# Patient Record
Sex: MALE | Race: WHITE | Employment: OTHER | URBAN - METROPOLITAN AREA
[De-identification: names, ages, dates, MRNs, and addresses within clinical notes are randomized per-mention and may not be internally consistent; named-entity substitution may affect disease eponyms.]

---

## 2017-06-12 ENCOUNTER — HOSPITAL ENCOUNTER (OUTPATIENT)
Dept: PHYSICAL THERAPY | Age: 72
Discharge: HOME | End: 2017-06-12
Payer: MEDICARE

## 2017-06-12 PROCEDURE — G8979 MOBILITY GOAL STATUS: HCPCS

## 2017-06-12 PROCEDURE — G8978 MOBILITY CURRENT STATUS: HCPCS

## 2017-06-12 PROCEDURE — 97163 PT EVAL HIGH COMPLEX 45 MIN: CPT

## 2017-06-12 PROCEDURE — 97110 THERAPEUTIC EXERCISES: CPT

## 2017-06-12 NOTE — PROGRESS NOTES
[facility]              [address] [facility]        [place] esteves, [address] [place]   [phone] fax    Plan of Care/ Statement of Necessity for Physical Therapy Services    Patient name: Prudence Plummer Start of Care: 2017   Referral source: Aylin Shankar MD : 1945    Medical Diagnosis: Other abnormalities of gait and mobility [R26.89]   Onset Date:    Treatment Diagnosis: Balance Deficits    Prior Hospitalization: see medical history Provider#: [id number]   Medications: Verified on Patient summary List    Comorbidities: Anxiety, Arthritis, Back pain, COPD, Depression, HTN, Kidney Disease, Osteoporosis, Sleep Dysfunction, PE (),    Prior Level of Function: Prior to  patient denies use of AD with ambulation with (I) with performance of self-care and household ADLs      The Plan of Care and following information is based on the information from the initial evaluation. Assessment/ key information:     Patient presents with a chronic history of low back pain and a history of falls (last fall 2013) with a complicated medical history including the following: COPD, mild to moderate sensorineural hearing loss bilaterally, HTN, osteoporosis and kidney disease. Patient as well demonstrates observationally signs consistent with diastasis recti, questioning contribution to low back pain subjectively reported. Patient noted with a wide base of support with use of single point cane with ambulation secondary to balance deficits. Patient with symmetrical and full LE strength demonstrated bilaterally but noted with diminished balance with maintenance of a narrow base of support and SLS.  Patient demonstrates an increased falls risk as evident by Arlin Ramirez Balance score and use of AD with ambulation.     Patient will benefit from skilled PT services to address functional mobility deficits, address strength deficits, analyze and cue movement patterns, assess and modify postural abnormalities, address imbalance/dizziness and instruct in home and community integration to attain remaining goals. Patient currently under the care of a Chiropractor for LBP therefore emphasis of Physical Therapy PoC to be placed on balance deficits. Plan of care to incorporate exercises to improve patient's proprioceptive and kinesthetic LE awareness, improve hip, knee and ankle balance strategies with incorporation of strategies to incorporate the vestibular, visual and proprioceptive systems. Limitations may present secondary to poor tolerance to supine lying, poor activity tolerance and weakness of the abdominal musculature. Patient presents with a fair rehab prognosis secondary to chronicity of symptoms and comorbidities. Evaluation Complexity History HIGH Complexity :3+ comorbidities / personal factors will impact the outcome/ POC ; Examination HIGH Complexity : 4+ Standardized tests and measures addressing body structure, function, activity limitation and / or participation in recreation  ;Presentation HIGH Complexity : Unstable and unpredictable characteristics  ; Clinical Decision Making MEDIUM Complexity : FOTO score of 26-74  Overall Complexity Rating: HIGH   Problem List: pain affecting function, decrease strength, impaired gait/ balance, decrease ADL/ functional abilitiies, decrease activity tolerance and decrease transfer abilities   Treatment Plan may include any combination of the following: Therapeutic exercise, Therapeutic activities, Neuromuscular re-education, Physical agent/modality, Gait/balance training, Manual therapy, Patient education, Functional mobility training, Home safety training and Stair training  Patient / Family readiness to learn indicated by: asking questions, trying to perform skills and interest  Persons(s) to be included in education: patient (P)  Barriers to Learning/Limitations: yes;  sensory deficits-vision/hearing/speech  Patient Goal (s): [employer] without pain, Walk without an assistive device  Patient Self Reported Health Status: fair  Rehabilitation Potential: fair    Short Term Goals: To be accomplished in 3 weeks:  1. Patient will be independent in performance of prescribed HEP. EVAL: HEP provided  2. Patient will be able to ambulate 2 blocks with only \"Moderate Difficulty\" per FOTO in order to improve community access. 3. Patient will demonstrate the ability to perform a posterior pelvic tilt in standing with correct form in order to improve proximal stability and standing tolerance. Long Term Goals: To be accomplished in 6 weeks:  1. Patient will demonstrate a significant functional improvement as demonstrated by a score of >50 on FOTO. 2. Patient will demonstrate the ability to perform sit-to-stand with correct mechanics and form in order to improve ability to perform transfers safely. 3. Patient will score >52 on the Champion Balance Test in order to demonstrate a diminished falls risk. 4. Patient will report \"No difficulty\" with getting in/out of the car per FOTO in order to improve community access. Frequency / Duration: Patient to be seen 2 times per week for 6 weeks. Patient/ Caregiver education and instruction: Diagnosis, prognosis, self care, activity modification and exercises   [x]  Plan of care has been reviewed with PTA    G-Codes (GP)  Mobility   Current  CL= 60-79%   Goal  CK= 40-59%    The severity rating is based on clinical judgment and the FOTO score. Certification Period: 6/12/2017 - 8/12/2017  Isabel Hodge 6/12/2017 10:45 AM  ________________________________________________________________________    I certify that the above Therapy Services are being furnished while the patient is under my care. I agree with the treatment plan and certify that this therapy is necessary.     [id number] Signature:____________________  Date:____________Time: _________    Please sign and return to [facility]  [facility]              [address] [facility], [address] [place]   [phone] fax

## 2017-06-12 NOTE — PROGRESS NOTES
PT DAILY TREATMENT NOTE/NEURO EVAL 3-16    Patient Name: Vignesh Gomes  Date:2017  : 1945  [x]  Patient  Verified  Payor: Cy Dolan / Plan: [place] / Product Type: Medicare /    In time:902  Out time:  Total Treatment Time (min): 73  Total Timed Codes (min): 73  1:1 Treatment Time ( [address] only): 73   Visit #: 1 of 12    Treatment Area: Other abnormalities of gait and mobility [R26.89]    SUBJECTIVE  Pain Level (0-10 scale): (Worst 10/10, Best 2/10)  []constant [x]intermittent []improving []worsening []no change since onset    Any medication changes, allergies to medications, adverse drug reactions, diagnosis change, or new procedure performed?: [x] No    [] Yes (see summary sheet for update)  Subjective functional status/changes:     PLOF: Prior to  patient denies use of AD with ambulation with (I) with performance of self-care and household ADLs  Comorbidities: Anxiety, Arthritis, Back pain, COPD (Nebulizer), Depression, HTN, Kidney Disease, Osteoporosis, Sleep Dysfunction, PE (),   Limitations to PLOF: Lifting, Prolonged walking (seconday to COPD), Climbing ladder  Mechanism of Injury: Onset ;  No specific TIFFANIE  Current symptoms/Complaints:Central Lumbosacral Pain, R Posterior LE (pain along R gluteal region distally to the popliteal fossa - relief of posterior thigh pain with Chiropractic care), Dizziness (vertiginous - Occurs with quick movement, Prolonged walking), Visual impairments (associated with dizziness), Hearing impairments (diminished acuity), Headache (frontal region), Bilateral LE N/T (reported along foot and distal lower leg) [Patient denies the following: Ear tinnitus, Occipital throbbing, bowel/bladder changes, pain with urination]  Previous Treatment/Compliance: Chiropractor (Current - 1x/week; significant improvement reported)  PMHx/Surgical Hx: Cataract Surgery (L/R), Fall History x2 (last 2017 - No LoC)  Work Hx: Retired  Living Situation: 1-story home, Lives with wife  Pt Goals: Ambulate without assistive device  Motivation: Patient appears motivated to return to previous level of function  FABQ Score: []low [x]elevate  Cognition: A & O x 3    Other:    Modality rationale: decrease pain to improve the patients ability to to return to performance of functional ADLs   10' []  Ice     [x]  heat Position: Seated  Location: Lumbar Spine       53 min [x]Eval                  []Re-Eval       10 min Therapeutic Exercise:  [x] See flow sheet : Patient educated in performance of prescribed HEP. Rationale: improve balance and increase proprioception to improve the patients ability to decrease falls risk and improve balance reactions. With   [] TE   [] TA   [] neuro   [] other: Patient Education: [x] Review HEP    [] Progressed/Changed HEP based on: Patient educated in performance of prescribed HEP and provided with written HEP instructions  [] positioning   [] body mechanics   [] transfers   [] heat/ice application    [] other:      Physical Therapy Evaluation  Neurologic    Vitals  BP: 152/92 mmHg (Post-Evaluation 145/90 mmHg)  HR: 68 bpm    Posture: [] Poor    [] Fair    [x] Good    Describe: Wide base of support    Supine: Evidence of Diastasis recti with patient with PMH significant for inguinal hernia x2    Gait: [] Normal    [x] Abnormal    Device: Single Point Cane   Describe: R lateral weight throughout gait pattern                                Hip L (1-5) R (1-5)   Hip Flexion 5 5   Hip ABD 5 5   Hip ER 5 5   Hip IR 5 5     Knee L (1-5) R (1-5)   Knee Flexion 5 5   Knee Extension 5 5   Ankle DF 5 5     **Limited assessment in supine secondary to patient intolerance to supine lying. Patient with inability to assume prone position therefore MMT not performed in this position. Lumbar AROM: Unable to assess secondary to patient with diminished balance reactions with testing deemed to be unsafe.     Sensation: Intact sensation noted to dermatome L4-S1 on the L/R    Reflexes: [] Not Tested   Left Right   Knee Jerk (L4) 2+ 2+   Ankle Jerk (SI) 2+ 2+         Sitting Balance: Static:  [x] Good    [] Fair    [] Poor        Standing Balance:    1. Rhomberg: EO 30, EC 30 (sagittal swaying)   2. Sharpened Rhomberg: EO 30 (L/R). EC 2 (L) / 6 (R)   3. SLS: L 4 sec, R 2 sec (R gluteal pain)   4. Sit-to-Stand: Diminished hip and knee extension with performance bilaterally  with maintenance of a wide Eric with ER of bilateral LEs    Other:       Impaired Judgement: [] Y    [x] N      Impaired Vision:  [x] Y    [] N      Safety Awareness Deficits  [] Y    [x] N      Impaired Hearing  [x] Y    [] N      Able to Express Needs [x] Y    [] N    Optional Tests:       Champion Balance Scale (56pt scale):  49/56    Pain Level (0-10 scale) post treatment: 8/10    ASSESSMENT/Changes in Function: Patient presents with a chronic history of low back pain and a history of falls (last fall January 7712) with a complicated medical history including the following: COPD, mild to moderate sensorineural hearing loss bilaterally, HTN, osteoporosis and kidney disease. Patient as well demonstrates observationally signs consistent with diastasis recti, questioning contribution to low back pain subjectively reported. Patient noted with a wide base of support with use of single point cane with ambulation secondary to balance deficits. Patient with symmetrical and full LE strength demonstrated bilaterally but noted with diminished balance with maintenance of a narrow base of support and SLS. Patient demonstrates an increased falls risk as evident by Arlin Ramirez Balance score and use of AD with ambulation. Patient will benefit from skilled PT services to address functional mobility deficits, address strength deficits, analyze and cue movement patterns, assess and modify postural abnormalities, address imbalance/dizziness and instruct in home and community integration to attain remaining goals.  Patient currently under the care of a Chiropractor for LBP therefore emphasis of Physical Therapy PoC to be placed on balance deficits. Plan of care to incorporate exercises to improve patient's proprioceptive and kinesthetic LE awareness, improve hip, knee and ankle balance strategies with incorporation of strategies to incorporate the vestibular, visual and proprioceptive systems. Limitations may present secondary to poor tolerance to supine lying, poor activity tolerance and weakness of the abdominal musculature. Patient presents with a fair rehab prognosis secondary to chronicity of symptoms and comorbidities. [x]  See Plan of Care  []  See progress note/recertification  []  See Discharge Summary         Progress towards goals / Updated goals:    Short Term Goals: To be accomplished in 3 weeks:  1. Patient will be independent in performance of prescribed HEP. EVAL: HEP provided  2. Patient will be able to ambulate 2 blocks with only \"Moderate Difficulty\" per FOTO in order to improve community access. EVAL: \"Quite a bit of difficulty\"  3. Patient will demonstrate the ability to perform a posterior pelvic tilt in standing with correct form in order to improve proximal stability and standing tolerance. EVAL: Performance of PPT in sitting educated  Long Term Goals: To be accomplished in 6 weeks:  1. Patient will demonstrate a significant functional improvement as demonstrated by a score of >50 on FOTO. EVAL: FOTO 32  2. Patient will demonstrate the ability to perform sit-to-stand with correct mechanics and form in order to improve ability to perform transfers safely. EVAL: Maintenance of hip and knee flexion with performance  3. Patient will score >52 on the Champion Balance Test in order to demonstrate a diminished falls risk. EVAL: 49/56  4. Patient will report \"No difficulty\" with getting in/out of the car per FOTO in order to improve community access.   EVAL: \"Moderate difficulty\"      PLAN  [x]  Upgrade activities as tolerated     []  Continue plan of care  []  Update interventions per flow sheet       []  Discharge due to:_  [x]  Other:2x/week, x6 weeks     Marek Archibald PT, DPT 6/12/2017  8:49 AM

## 2017-06-16 ENCOUNTER — HOSPITAL ENCOUNTER (OUTPATIENT)
Dept: PHYSICAL THERAPY | Age: 72
Discharge: HOME | End: 2017-06-16
Payer: MEDICARE

## 2017-06-16 PROCEDURE — 97112 NEUROMUSCULAR REEDUCATION: CPT

## 2017-06-16 PROCEDURE — 97110 THERAPEUTIC EXERCISES: CPT

## 2017-06-16 NOTE — PROGRESS NOTES
PT DAILY TREATMENT NOTE - [facility]     Patient Name: Vickey Record  Date:2017  : 1945  [x]  Patient  Verified  Payor: Madan Hyman / Plan: [place] MED[place]ARE PART A & B / Product Type: Medicare /    In [id number]  Out [id number]  Total Treatment Time (min): 39  Total Timed Codes (min): 29  1:1 Treatment Time ([facility] only): 29   Visit #: 2 of 12    Treatment Area: Other abnormalities of gait and mobility [R26.89]    SUBJECTIVE  Pain Level (0-10 scale): 4/10 (LBP, R Hip)  Any medication changes, allergies to medications, adverse drug reactions, diagnosis change, or new procedure performed?: [x] No    [] Yes (see summary sheet for update)  Subjective functional status/changes:   [] No changes reported  Patient reports performance of prescribed HEP 2x/day. OBJECTIVE    Modality rationale: decrease pain and increase tissue extensibility to improve the patients ability to tolerate prolonged ambulation. Min Type Additional Details   10 []  Ice     [x]  heat  []  Ice massage  []  Laser   []  Anodyne Position: Seated  Location: Lower back, Post-Tx     15 min Therapeutic Exercise:  [x] See flow sheet : Emphasis placed on weight-bearing LE functional strengthenin   Rationale: increase strength and improve coordination to improve the patients ability to tolerate prolonged ambulation to increase community accessibility. 14 min Neuromuscular Re-education:  [x]  See flow sheet : Utilization of Airex and cervical rotation in order to place greater emphasis on the vestibular system   Rationale: improve coordination, improve balance and increase proprioception  To decrease falls risk and improve community access.     With   [] TE   [] TA   [] neuro   [] other: Patient Education: [x] Review HEP    [] Progressed/Changed HEP based on:   [] positioning   [] body mechanics   [] transfers   [] heat/ice application    [] other:      Pain Level (0-10 scale) post treatment: 2/10    ASSESSMENT/Changes in Function: Greater balance deficits noted with elimination of the proprioceptive and visual systems through use of Airex and cervical rotation with static balance activities. Reduced activity tolerance demonstrated thus impacting the ability to increase intensity of within clinic treatment and limiting the overall extent of the treatment. Initiated performance of standing hip flexor stretch with patient with subjective report of R hip pain. Patient will continue to benefit from skilled PT services to modify and progress therapeutic interventions, address functional mobility deficits, address ROM deficits and address strength deficits to attain remaining goals. []  See Plan of Care  []  See progress note/recertification  []  See Discharge Summary         Progress towards goals / Updated goals:    Short Term Goals: To be accomplished in 3 weeks:  1. Patient will be independent in performance of prescribed HEP. EVAL: HEP provided  Current: MET, Reported performance 2x/day  2. Patient will be able to ambulate 2 blocks with only \"Moderate Difficulty\" per FOTO in order to improve community access. EVAL: \"Quite a bit of bit of difficulty\" reported  3. Patient will demonstrate the ability to perform a posterior pelvic tilt in standing with correct form in order to improve proximal stability and standing tolerance. EVAL: Patient educated in PPT in supine  Long Term Goals: To be accomplished in 6 weeks:  1. Patient will demonstrate a significant functional improvement as demonstrated by a score of >50 on FOTO. EVAL: FOTO = 32  2. Patient will demonstrate the ability to perform sit-to-stand with correct mechanics and form in order to improve ability to perform transfers safely. EVAL: Unweighting of R LE with performance  3. Patient will score >52 on the Champion Balance Test in order to demonstrate a diminished falls risk. EVAL: Champion Balance = 49  4.  Patient will report \"No difficulty\" with getting in/out of the car per FOTO in order to improve community access.   EVAL: \"Moderate difficulty\" reported    PLAN  [x]  Upgrade activities as tolerated     [x]  Continue plan of care  []  Update interventions per flow sheet       []  Discharge due to:_  []  Other:_      Reyes Coto, PT 6/16/2017  8:52 AM    Future Appointments  Date Time Provider Danielito Crawford   6/16/2017 9:00 AM Reyes Coto, PT [facility]   6/22/2017 2:30 PM Quinton Trejo, PTA [facility]   6/23/2017 9:00 AM Porsche E Laws, PTA [facility]   6/28/2017 9:00 AM Porsche E Laws, PTA [facility]   6/30/2017 9:00 AM Porsche E Laws, PTA [facility]   7/5/2017 9:30 AM Porsche E Laws, PTA [facility]   7/7/2017 9:00 AM Reyes Coto, PT [facility]   7/12/2017 9:00 AM Porsche E Laws, PTA [facility]   7/14/2017 9:00 AM Porsche E Laws, PTA [facility]   7/19/2017 9:00 AM Porsche E Laws, PTA [facility]   7/21/2017 9:00 AM Porsche E Laws, PTA [facility]

## 2017-06-21 ENCOUNTER — APPOINTMENT (OUTPATIENT)
Dept: PHYSICAL THERAPY | Age: 72
End: 2017-06-21
Payer: MEDICARE

## 2017-06-22 ENCOUNTER — HOSPITAL ENCOUNTER (OUTPATIENT)
Dept: PHYSICAL THERAPY | Age: 72
Discharge: HOME | End: 2017-06-22
Payer: MEDICARE

## 2017-06-22 PROCEDURE — 97112 NEUROMUSCULAR REEDUCATION: CPT

## 2017-06-22 PROCEDURE — 97110 THERAPEUTIC EXERCISES: CPT

## 2017-06-22 NOTE — PROGRESS NOTES
PT DAILY TREATMENT NOTE - [facility]     Patient Name: Mike Roldan  Date:2017  : 1945  [x]  Patient  Verified  Payor: Sawyer Faye / Plan: [place] & [place] / Product Type: Medicare /    In time:2:17  Out time:3:09  Total Treatment Time (min): 52  Total Timed Codes (min): 42  1:1 Treatment Time ( [address] only): 42   Visit #: 3 of 12    Treatment Area: Other abnormalities of gait and mobility [R26.89]    SUBJECTIVE  Pain Level (0-10 scale): 6  Any medication changes, allergies to medications, adverse drug reactions, diagnosis change, or new procedure performed?: [x] No    [] Yes (see summary sheet for update)  Subjective functional status/changes:   [] No changes reported  Pt reports that his back is bothering him a lot today because he did a lot of sitting and walking yesterday. Pt reports he has difficulty going up and down stairs. OBJECTIVE    Modality rationale: decrease pain to improve the patients ability to decrease difficulty while performing tasks.     Min Type Additional Details    [] Estim:  []Unatt       []IFC  []Premod                        []Other:  []w/ice   []w/heat  Position:  Location:    [] Estim: []Att    []TENS instruct  []NMES                    []Other:  []w/US   []w/ice   []w/heat  Position:  Location:    []  Traction: [] Cervical       []Lumbar                       [] Prone          []Supine                       []Intermittent   []Continuous Lbs:  [] before manual  [] after manual    []  Ultrasound: []Continuous   [] Pulsed                           []1MHz   []3MHz W/cm2:  Location:    []  Iontophoresis with dexamethasone         Location: [] Take home patch   [] In clinic   10 []  Ice     [x]  heat  []  Ice massage  []  Laser   []  Anodyne Position:sitting  Location:back    []  Laser with stim  []  Other:  Position:  Location:    []  Vasopneumatic Device Pressure:       [] lo [] med [] hi   Temperature: [] lo [] med [] hi   [] Skin assessment post-treatment:  []intact []redness- no adverse reaction    []redness  adverse reaction:       27 min Therapeutic Exercise:  [x] See flow sheet :   Rationale: increase ROM and increase strength to improve the patients ability to increase tolerance to activities. 15 min Neuromuscular Re-education:  [x]  See flow sheet :balance ex's   Rationale: increase ROM, increase strength, improve coordination, improve balance and increase proprioception  to improve the patients ability to increase ease with ADLs. With   [] TE   [] TA   [] neuro   [] other: Patient Education: [x] Review HEP    [] Progressed/Changed HEP based on:   [] positioning   [] body mechanics   [] transfers   [] heat/ice application    [] other:      Other Objective/Functional Measures: Pt is able to perform posterior pelvic tilt in sitting. Pain Level (0-10 scale) post treatment: 3    ASSESSMENT/Changes in Function: Pt reported that he was having increase in th pain in his back when performing standing hip abd without resistance. Continue per  POC. Patient will continue to benefit from skilled PT services to modify and progress therapeutic interventions, address functional mobility deficits, address ROM deficits, address strength deficits and analyze and address soft tissue restrictions to attain remaining goals. []  See Plan of Care  []  See progress note/recertification  []  See Discharge Summary         Progress towards goals / Updated goals:  Short Term Goals: To be accomplished in 3 weeks:  1. Patient will be independent in performance of prescribed HEP. EVAL: HEP provided  Current: MET, Reported performance 2x/day 6/16/17  2. Patient will be able to ambulate 2 blocks with only \"Moderate Difficulty\" per FOTO in order to improve community access. EVAL: \"Quite a bit of bit of difficulty\" reported  3.  Patient will demonstrate the ability to perform a posterior pelvic tilt in standing with correct form in order to improve proximal stability and standing tolerance. EVAL: Patient educated in PPT in supine  Current: Progressing: Pt is able to perform posterior pelvic tilt in sitting. 6/22/17  Long Term Goals: To be accomplished in 6 weeks:  1. Patient will demonstrate a significant functional improvement as demonstrated by a score of >50 on FOTO. EVAL: FOTO = 32  2. Patient will demonstrate the ability to perform sit-to-stand with correct mechanics and form in order to improve ability to perform transfers safely. EVAL: Unweighting of R LE with performance  3. Patient will score >52 on the Champion Balance Test in order to demonstrate a diminished falls risk. EVAL: Champion Balance = 49  4. Patient will report \"No difficulty\" with getting in/out of the car per FOTO in order to improve community access.   EVAL: \"Moderate difficulty\" reported    PLAN  []  Upgrade activities as tolerated     [x]  Continue plan of care  []  Update interventions per flow sheet       []  Discharge due to:_  []  Other:_      Robert Villanueva PTA 6/22/2017  2:21 PM    Future Appointments  Date Time Provider Danielito Crawford   6/22/2017 2:30 PM Robert Villanueva PTA [facility]   6/23/2017 9:00 AM Porsche E Laws, PTA [facility]   6/28/2017 9:00 AM Porsche E Laws, PTA [facility]   6/30/2017 9:00 AM Porsche E Laws, PTA [facility]   7/5/2017 9:30 AM Porsche E Laws, PTA [facility]   7/7/2017 9:00 AM Corinne Riley, PT [facility]   7/12/2017 9:00 AM Porsche E Laws, PTA [facility]   7/14/2017 9:00 AM Porsche E Laws, PTA [facility]   7/19/2017 9:00 AM Porsche E Laws, PTA [facility]   7/21/2017 9:00 AM Porsche Alvarado, PTA [facility]

## 2017-06-23 ENCOUNTER — HOSPITAL ENCOUNTER (OUTPATIENT)
Dept: PHYSICAL THERAPY | Age: 72
Discharge: HOME | End: 2017-06-23
Payer: MEDICARE

## 2017-06-23 PROCEDURE — 97112 NEUROMUSCULAR REEDUCATION: CPT

## 2017-06-23 PROCEDURE — 97110 THERAPEUTIC EXERCISES: CPT

## 2017-06-23 NOTE — PROGRESS NOTES
PT DAILY TREATMENT NOTE - [facility]     Patient Name: Corine Kenyon  Date:2017  : 1945  [x]  Patient  Verified  Payor: Cherylkrystin Chahal / Plan: [place] MED[place]ARE PART A & B / Product Type: Medicare /    In time: 8:53  Out time:9:48  Total Treatment Time (min): 55  Total Timed Codes (min): 45  1:1 Treatment Time ( [address] only): 30   Visit #: 4 of 12    Treatment Area: Other abnormalities of gait and mobility [R26.89]    SUBJECTIVE  Pain Level (0-10 scale): 2/10  Any medication changes, allergies to medications, adverse drug reactions, diagnosis change, or new procedure performed?: [x] No    [] Yes (see summary sheet for update)  Subjective functional status/changes:   [] No changes reported  Pt reports at time she has trouble keeping his balance with just standing. OBJECTIVE    Modality rationale: decrease pain to improve the patients ability to increase ease of ADLs. Min Type Additional Details    [] Estim:  []Unatt       []IFC  []Premod                        []Other:  []w/ice   []w/heat  Position:  Location:    [] Estim: []Att    []TENS instruct  []NMES                    []Other:  []w/US   []w/ice   []w/heat  Position:  Location:    []  Traction: [] Cervical       []Lumbar                       [] Prone          []Supine                       []Intermittent   []Continuous Lbs:  [] before manual  [] after manual    []  Ultrasound: []Continuous   [] Pulsed                           []1MHz   []3MHz W/cm2:  Location:    []  Iontophoresis with dexamethasone         Location: [] Take home patch   [] In clinic   10 []  Ice     [x]  heat  []  Ice massage  []  Laser   []  Anodyne Position:  seated  Location: low back.     []  Laser with stim  []  Other:  Position:  Location:    []  Vasopneumatic Device Pressure:       [] lo [] med [] hi   Temperature: [] lo [] med [] hi   [] Skin assessment post-treatment:  []intact []redness- no adverse reaction    []redness  adverse reaction:     30 min Therapeutic Exercise: [x] See flow sheet :   Rationale: increase strength to improve the patients ability to perform ADLs. 15 min Neuromuscular Re-education:  [x]  See flow sheet : balance acts per flow sheet   Rationale: improve coordination, improve balance and increase proprioception  to improve the patients ability to perform ADLs. With   [] TE   [] TA   [] neuro   [] other: Patient Education: [x] Review HEP    [] Progressed/Changed HEP based on:   [] positioning   [] body mechanics   [] transfers   [] heat/ice application    [] other:      Other Objective/Functional Measures: Pt compensates with B LES during PPT in sitting. Pt performed in standing with decreased compensation but minimal ROM. Pain Level (0-10 scale) post treatment: 2/10    ASSESSMENT/Changes in Function: Cont per POC. Patient will continue to benefit from skilled PT services to modify and progress therapeutic interventions, address functional mobility deficits, address strength deficits, analyze and cue movement patterns and address imbalance/dizziness to attain remaining goals. []  See Plan of Care  []  See progress note/recertification  []  See Discharge Summary         Progress towards goals / Updated goals:  Short Term Goals: To be accomplished in 3 weeks:  1. Patient will be independent in performance of prescribed HEP. EVAL: HEP provided  Current: MET, Reported performance 2x/day 6/16/17  2. Patient will be able to ambulate 2 blocks with only \"Moderate Difficulty\" per FOTO in order to improve community access. EVAL: \"Quite a bit of bit of difficulty\" reported  3. Patient will demonstrate the ability to perform a posterior pelvic tilt in standing with correct form in order to improve proximal stability and standing tolerance. EVAL: Patient educated in PPT in supine  Current: Progressing: Pt performs PPT in standing with minimal ROM. 6/23/17  Long Term Goals: To be accomplished in 6 weeks:  1.  Patient will demonstrate a significant functional improvement as demonstrated by a score of >50 on FOTO. EVAL: FOTO = 32  2. Patient will demonstrate the ability to perform sit-to-stand with correct mechanics and form in order to improve ability to perform transfers safely. EVAL: Unweighting of R LE with performance  3. Patient will score >52 on the Champion Balance Test in order to demonstrate a diminished falls risk. EVAL: Champion Balance = 49  4. Patient will report \"No difficulty\" with getting in/out of the car per FOTO in order to improve community access.   EVAL: \"Moderate difficulty\" reported    PLAN  []  Upgrade activities as tolerated     [x]  Continue plan of care  []  Update interventions per flow sheet       []  Discharge due to:_  []  Other:_      Porsche E Laws, PTA 6/23/2017  10:29 AM    Future Appointments  Date Time Provider Danielito Crawford   6/28/2017 9:00 AM Porsche E Laws, PTA [facility]   6/30/2017 9:00 AM Porsche E Laws, PTA [facility]   7/5/2017 9:30 AM Porsche E Laws, PTA [facility]   7/7/2017 9:00 AM Jayda Ear, PT [facility]   7/12/2017 9:00 AM Porsche E Laws, PTA [facility]   7/14/2017 9:00 AM Porsche E Laws, PTA [facility]   7/19/2017 9:00 AM Porsche E Laws, PTA [facility]   7/21/2017 9:00 AM Porsche E Laws, PTA [facility]

## 2017-06-28 ENCOUNTER — HOSPITAL ENCOUNTER (OUTPATIENT)
Dept: PHYSICAL THERAPY | Age: 72
Discharge: HOME | End: 2017-06-28
Payer: MEDICARE

## 2017-06-28 PROCEDURE — 97112 NEUROMUSCULAR REEDUCATION: CPT

## 2017-06-28 PROCEDURE — 97110 THERAPEUTIC EXERCISES: CPT

## 2017-06-28 NOTE — PROGRESS NOTES
PT DAILY TREATMENT NOTE - [facility]     Patient Name: Richard Sarkar  Date:2017  : 1945  [x]  Patient  Verified  Payor: Mick Yi / Plan: [place] MEDICARE PART A & B / Product Type: Medicare /    In time: 8:52  Out time:9:45  Total Treatment Time (min): 53  Total Timed Codes (min): 43  1:1 Treatment Time ( [address] only): 23   Visit #: 5 of 12    Treatment Area: Other abnormalities of gait and mobility [R26.89]    SUBJECTIVE  Pain Level (0-10 scale): 2/10  Any medication changes, allergies to medications, adverse drug reactions, diagnosis change, or new procedure performed?: [x] No    [] Yes (see summary sheet for update)  Subjective functional status/changes:   [] No changes reported  Pt states he walked in a parking lot and it started to cause him to be SOB. OBJECTIVE    Modality rationale: decrease pain to improve the patients ability to increase ease of ADLs.     Min Type Additional Details    [] Estim:  []Unatt       []IFC  []Premod                        []Other:  []w/ice   []w/heat  Position:  Location:    [] Estim: []Att    []TENS instruct  []NMES                    []Other:  []w/US   []w/ice   []w/heat  Position:  Location:    []  Traction: [] Cervical       []Lumbar                       [] Prone          []Supine                       []Intermittent   []Continuous Lbs:  [] before manual  [] after manual    []  Ultrasound: []Continuous   [] Pulsed                           []1MHz   []3MHz W/cm2:  Location:    []  Iontophoresis with dexamethasone         Location: [] Take home patch   [] In clinic   10 []  Ice     [x]  heat  []  Ice massage  []  Laser   []  Anodyne Position: seated  Location: low back    []  Laser with stim  []  Other:  Position:  Location:    []  Vasopneumatic Device Pressure:       [] lo [] med [] hi   Temperature: [] lo [] med [] hi   [] Skin assessment post-treatment:  []intact []redness- no adverse reaction    []redness  adverse reaction:     28 min Therapeutic Exercise:  [x] See flow sheet :   Rationale: increase ROM and increase strength to improve the patients ability to perform ADLs. 15 min Neuromuscular Re-education:  [x]  See flow sheet :   Rationale: increase ROM and increase strength  to improve the patients ability to perform ADLs. With   [] TE   [] TA   [] neuro   [] other: Patient Education: [x] Review HEP    [] Progressed/Changed HEP based on:   [] positioning   [] body mechanics   [] transfers   [] heat/ice application    [] other:      Other Objective/Functional Measures: Pt reports quite a bit of difficulty with walking 2 block due to SOB, which causes light headedness and dizziness. Pain Level (0-10 scale) post treatment: 5/10    ASSESSMENT/Changes in Function: Cont per POC. Patient will continue to benefit from skilled PT services to modify and progress therapeutic interventions, address functional mobility deficits, address ROM deficits and address strength deficits to attain remaining goals. []  See Plan of Care  []  See progress note/recertification  []  See Discharge Summary         Progress towards goals / Updated goals:  Short Term Goals: To be accomplished in 3 weeks:  1. Patient will be independent in performance of prescribed HEP. EVAL: HEP provided  Current: MET, Reported performance 2x/day 6/16/17  2. Patient will be able to ambulate 2 blocks with only \"Moderate Difficulty\" per FOTO in order to improve community access. EVAL: \"Quite a bit of bit of difficulty\" reported  Current: Not met, \"quite difficult\" due to SOB which causes light headedness and LOB. 6/28/17   3. Patient will demonstrate the ability to perform a posterior pelvic tilt in standing with correct form in order to improve proximal stability and standing tolerance. EVAL: Patient educated in PPT in supine  Current: Progressing: Pt performs PPT in standing with minimal ROM. 6/23/17  Long Term Goals: To be accomplished in 6 weeks:  1.  Patient will demonstrate a significant functional improvement as demonstrated by a score of >50 on FOTO. EVAL: FOTO = 32  2. Patient will demonstrate the ability to perform sit-to-stand with correct mechanics and form in order to improve ability to perform transfers safely. EVAL: Unweighting of R LE with performance  3. Patient will score >52 on the Champion Balance Test in order to demonstrate a diminished falls risk. EVAL: Champion Balance = 49  4. Patient will report \"No difficulty\" with getting in/out of the car per FOTO in order to improve community access.   EVAL: \"Moderate difficulty\" reported    PLAN  []  Upgrade activities as tolerated     [x]  Continue plan of care  []  Update interventions per flow sheet       []  Discharge due to:_  []  Other:_      Porsche Alvarado, PTA 6/28/2017  10:08 AM    Future Appointments  Date Time Provider Danielito Crawford   6/30/2017 9:00 AM Porsche E Laws, PTA [facility]   7/5/2017 9:30 AM Porsche E Laws, PTA [facility]   7/7/2017 9:00 AM Live Ruiz, PT [facility]   7/12/2017 9:00 AM Porsche E Laws, PTA [facility]   7/14/2017 9:00 AM Porsche E Laws, PTA [facility]   7/19/2017 9:00 AM Porsche E Laws, PTA [facility]   7/21/2017 9:00 AM Porsche E Laws, PTA [facility]

## 2017-06-30 ENCOUNTER — HOSPITAL ENCOUNTER (OUTPATIENT)
Dept: PHYSICAL THERAPY | Age: 72
Discharge: HOME | End: 2017-06-30
Payer: MEDICARE

## 2017-06-30 PROCEDURE — 97112 NEUROMUSCULAR REEDUCATION: CPT

## 2017-06-30 PROCEDURE — 97110 THERAPEUTIC EXERCISES: CPT

## 2017-06-30 NOTE — PROGRESS NOTES
PT DAILY TREATMENT NOTE - [facility]     Patient Name: Rolf Mack  Date:2017  : 1945  [x]  Patient  Verified  Payor: Sherwin Riley / Plan: [place] MEDICARE PART A & B / Product Type: Medicare /    In time: 8:39  Out time:9:31  Total Treatment Time (min): 52  Total Timed Codes (min): 42  1:1 Treatment Time ( [address] only): 30   Visit #: 6 of 12    Treatment Area: Other abnormalities of gait and mobility [R26.89]    SUBJECTIVE  Pain Level (0-10 scale): 7/10  Any medication changes, allergies to medications, adverse drug reactions, diagnosis change, or new procedure performed?: [x] No    [] Yes (see summary sheet for update)  Subjective functional status/changes:   [] No changes reported  Pt states he did a lot of walking at the [facility] and could no go far without breaks. OBJECTIVE    Modality rationale: decrease pain to improve the patients ability to increase ease of ADLs.     Min Type Additional Details    [] Estim:  []Unatt       []IFC  []Premod                        []Other:  []w/ice   []w/heat  Position:  Location:    [] Estim: []Att    []TENS instruct  []NMES                    []Other:  []w/US   []w/ice   []w/heat  Position:  Location:    []  Traction: [] Cervical       []Lumbar                       [] Prone          []Supine                       []Intermittent   []Continuous Lbs:  [] before manual  [] after manual    []  Ultrasound: []Continuous   [] Pulsed                           []1MHz   []3MHz W/cm2:  Location:    []  Iontophoresis with dexamethasone         Location: [] Take home patch   [] In clinic   10 []  Ice     [x]  heat  []  Ice massage  []  Laser   []  Anodyne Position: seated  Location: low back    []  Laser with stim  []  Other:  Position:  Location:    []  Vasopneumatic Device Pressure:       [] lo [] med [] hi   Temperature: [] lo [] med [] hi   [] Skin assessment post-treatment:  []intact []redness- no adverse reaction    []redness  adverse reaction:     27 min Therapeutic Exercise:  [x] See flow sheet :   Rationale: increase ROM and increase strength to improve the patients ability to increase ease of ADLs. 15 min Neuromuscular Re-education:  [x]  See flow sheet :   Rationale: increase strength  to improve the patients ability to increase ease of ADLs. With   [] TE   [] TA   [] neuro   [] other: Patient Education: [x] Review HEP    [] Progressed/Changed HEP based on:   [] positioning   [] body mechanics   [] transfers   [] heat/ice application    [] other:      Other Objective/Functional Measures: Initiated dynamic gait. No LOB during dynamic gait. Pain Level (0-10 scale) post treatment: 7/10    ASSESSMENT/Changes in Function: Cont per POC. Patient will continue to benefit from skilled PT services to modify and progress therapeutic interventions, address functional mobility deficits, address ROM deficits and address strength deficits to attain remaining goals. []  See Plan of Care  []  See progress note/recertification  []  See Discharge Summary         Progress towards goals / Updated goals:  Short Term Goals: To be accomplished in 3 weeks:  1. Patient will be independent in performance of prescribed HEP. EVAL: HEP provided  Current: MET, Reported performance 2x/day 6/16/17  2. Patient will be able to ambulate 2 blocks with only \"Moderate Difficulty\" per FOTO in order to improve community access. EVAL: \"Quite a bit of bit of difficulty\" reported  Current: Not met, \"quite difficult\" due to SOB which causes light headedness and LOB. 6/28/17   3. Patient will demonstrate the ability to perform a posterior pelvic tilt in standing with correct form in order to improve proximal stability and standing tolerance. EVAL: Patient educated in PPT in supine  Current: Progressing: Pt performs PPT in standing with minimal ROM.  6/23/17  Long Term Goals: To be accomplished in 6 weeks:  1.  Patient will demonstrate a significant functional improvement as demonstrated by a score of >50 on FOTO. EVAL: FOTO = 32  2. Patient will demonstrate the ability to perform sit-to-stand with correct mechanics and form in order to improve ability to perform transfers safely. EVAL: Unweighting of R LE with performance  3. Patient will score >52 on the Champion Balance Test in order to demonstrate a diminished falls risk. EVAL: Champion Balance = 49  4. Patient will report \"No difficulty\" with getting in/out of the car per FOTO in order to improve community access.   EVAL: \"Moderate difficulty\" reported    PLAN  []  Upgrade activities as tolerated     [x]  Continue plan of care  []  Update interventions per flow sheet       []  Discharge due to:_  []  Other:_      Porschealyssa Alvarado, PTA 6/30/2017  9:59 AM    Future Appointments  Date Time Provider Danielito Crawford   7/5/2017 9:30 AM Porschealyssa Alvarado, PTA [facility]   7/7/2017 9:00 AM Daria Han, PT [facility]   7/12/2017 9:00 AM Porsche E Laws, PTA [facility]   7/14/2017 9:00 AM Porsche E Laws, PTA [facility]   7/19/2017 9:00 AM Porsche E Laws, PTA [facility]   7/21/2017 9:00 AM Porsche E Laws, PTA [facility]

## 2017-07-05 ENCOUNTER — HOSPITAL ENCOUNTER (OUTPATIENT)
Dept: PHYSICAL THERAPY | Age: 72
Discharge: HOME | End: 2017-07-05
Payer: MEDICARE

## 2017-07-05 PROCEDURE — 97110 THERAPEUTIC EXERCISES: CPT

## 2017-07-05 PROCEDURE — 97112 NEUROMUSCULAR REEDUCATION: CPT

## 2017-07-05 NOTE — PROGRESS NOTES
PT DAILY TREATMENT NOTE - [facility]     Patient Name: Belen Bennett  Date:2017  : 1945  [x]  Patient  Verified  Payor: Delroy Bernstein / Plan: [place] MED[place] & B / Product Type: Medicare /    In time: 9:37  Out time: 10:32  Total Treatment Time (min): 55  Total Timed Codes (min): 45  1:1 Treatment Time ( [address] only): 55  Visit #: 7 of 12    Treatment Area: Other abnormalities of gait and mobility [R26.89]    SUBJECTIVE  Pain Level (0-10 scale): 5/10  Any medication changes, allergies to medications, adverse drug reactions, diagnosis change, or new procedure performed?: [x] No    [] Yes (see summary sheet for update)  Subjective functional status/changes:   [] No changes reported  Pt states when he bends over his he gets dizzy. OBJECTIVE    Modality rationale: decrease pain to improve the patients ability to increase ease of ADLs. Min Type Additional Details    [] Estim:  []Unatt       []IFC  []Premod                        []Other:  []w/ice   []w/heat  Position:  Location:    [] Estim: []Att    []TENS instruct  []NMES                    []Other:  []w/US   []w/ice   []w/heat  Position:  Location:    []  Traction: [] Cervical       []Lumbar                       [] Prone          []Supine                       []Intermittent   []Continuous Lbs:  [] before manual  [] after manual    []  Ultrasound: []Continuous   [] Pulsed                           []1MHz   []3MHz W/cm2:  Location:    []  Iontophoresis with dexamethasone         Location: [] Take home patch   [] In clinic   10 []  Ice     [x]  heat  []  Ice massage  []  Laser   []  Anodyne Position: seated  Location:  Low back.      []  Laser with stim  []  Other:  Position:  Location:    []  Vasopneumatic Device Pressure:       [] lo [] med [] hi   Temperature: [] lo [] med [] hi   [] Skin assessment post-treatment:  []intact []redness- no adverse reaction    []redness  adverse reaction:     30 min Therapeutic Exercise:  [x] See flow sheet : Rationale: increase strength, improve coordination, improve balance and increase proprioception to improve the patients ability to increase ease of ADLs. 15 min Neuromuscular Re-education:  [x]  See flow sheet :   Rationale: increase strength, improve coordination, improve balance and increase proprioception  to improve the patients ability to increase ease of working in his yard. With   [] TE   [] TA   [] neuro   [] other: Patient Education: [x] Review HEP    [] Progressed/Changed HEP based on:   [] positioning   [] body mechanics   [] transfers   [] heat/ice application    [] other:      Other Objective/Functional Measures:  FOTO 47, an increase of 15. Pain Level (0-10 scale) post treatment: 2/10    ASSESSMENT/Changes in Function: Cont per POC. Patient will continue to benefit from skilled PT services to modify and progress therapeutic interventions, address functional mobility deficits, analyze and address soft tissue restrictions and address imbalance/dizziness to attain remaining goals. []  See Plan of Care  []  See progress note/recertification  []  See Discharge Summary         Progress towards goals / Updated goals:  Short Term Goals: To be accomplished in 3 weeks:  1. Patient will be independent in performance of prescribed HEP. EVAL: HEP provided  Current: MET, Reported performance 2x/day 6/16/17  2. Patient will be able to ambulate 2 blocks with only \"Moderate Difficulty\" per FOTO in order to improve community access. EVAL: \"Quite a bit of bit of difficulty\" reported  Current: Not met, \"quite difficult\" due to SOB which causes light headedness and LOB. 6/28/17   3. Patient will demonstrate the ability to perform a posterior pelvic tilt in standing with correct form in order to improve proximal stability and standing tolerance. EVAL: Patient educated in PPT in supine  Current: Progressing: Pt performs PPT in standing with minimal ROM.   6/23/17  Long Term Goals: To be accomplished in 6 weeks:  1. Patient will demonstrate a significant functional improvement as demonstrated by a score of >50 on FOTO. EVAL: FOTO = 32  Current: Progressing, 47 an increase of 15.  7/5/17  2. Patient will demonstrate the ability to perform sit-to-stand with correct mechanics and form in order to improve ability to perform transfers safely. EVAL: Unweighting of R LE with performance  3. Patient will score >52 on the Champion Balance Test in order to demonstrate a diminished falls risk. EVAL: Champion Balance = 49  4. Patient will report \"No difficulty\" with getting in/out of the car per FOTO in order to improve community access.   EVAL: \"Moderate difficulty\" reported    PLAN  []  Upgrade activities as tolerated     [x]  Continue plan of care  []  Update interventions per flow sheet       []  Discharge due to:_  []  Other:_      Porsche Alvarado, PTA 7/5/2017  9:58 AM    Future Appointments  Date Time Provider Danileito Crawford   7/7/2017 9:00 AM Tim Sierra PT [facility]   7/12/2017 9:00 AM Porsche E Laws, PTA [facility]   7/14/2017 9:00 AM Porsche E Laws, PTA [facility]   7/19/2017 9:00 AM Porsche DANUTA Laws, PTA [facility]   7/21/2017 9:00 AM Porschealyssa Alvarado, PTA [facility]

## 2017-07-07 ENCOUNTER — HOSPITAL ENCOUNTER (OUTPATIENT)
Dept: PHYSICAL THERAPY | Age: 72
Discharge: HOME | End: 2017-07-07
Payer: MEDICARE

## 2017-07-07 PROCEDURE — 97112 NEUROMUSCULAR REEDUCATION: CPT

## 2017-07-07 PROCEDURE — G8979 MOBILITY GOAL STATUS: HCPCS

## 2017-07-07 PROCEDURE — 97110 THERAPEUTIC EXERCISES: CPT

## 2017-07-07 PROCEDURE — G8978 MOBILITY CURRENT STATUS: HCPCS

## 2017-07-07 NOTE — PROGRESS NOTES
[facility]              [address]        [place], [address]   [phone] fax    Medicare Progress Report    Patient name: Shahram Ewing Start of Care: 2017   Referral source: Nagi Roca MD : 1945   Medical/Treatment Diagnosis: Other abnormalities of gait and mobility [R26.89] Onset Date:     Prior Hospitalization: see medical history Provider#: [id number]   Medications: Verified on Patient Summary List    Comorbidities: Anxiety, Arthritis, Back pain, COPD, Depression, HTN, Kidney Disease, Osteoporosis, Sleep Dysfunction, PE (),    Prior Level of Function: Prior to  patient denies use of AD with ambulation with (I) with performance of self-care and household ADLs  Visits from Start of Care: 8    Missed Visits: 0    Reporting Period: 2017 to 2017    Subjective Reports: Patient reports overall since SoC an improvement in symptoms with good compliance with prescribed HEP. Patient reports an improvement in unsteadiness with gait with decreased usage of cane subjectively reported. Short Term Goals: To be accomplished in 3 weeks:  1. Patient will be independent in performance of prescribed HEP. EVAL: HEP provided  At PN: MET, Reported performance 2x/day   2. Patient will be able to ambulate 2 blocks with only \"Moderate Difficulty\" per FOTO in order to improve community access. EVAL: \"Quite a bit of bit of difficulty\" reported  At PN: Not met, \"quite difficult\" due to SOB which causes light headedness and LOB. 3. Patient will demonstrate the ability to perform a posterior pelvic tilt in standing with correct form in order to improve proximal stability and standing tolerance. EVAL: Patient educated in PPT in supine  At PN: Progressing: Pt performs PPT in standing with minimal ROM.      Long Term Goals: To be accomplished in 6 weeks:  1.  Patient will demonstrate a significant functional improvement as demonstrated by a score of >50 on FOTO. EVAL: FOTO = 32  At PN: Progressing, 47 an increase of 15.  2. Patient will demonstrate the ability to perform sit-to-stand with correct mechanics and form in order to improve ability to perform transfers safely. EVAL: Unweighting of R LE with performance  At PN: Met, Symmetrical weight-bearing through bilateral LE with correct mechanics  3. Patient will score >52 on the Champion Balance Test in order to demonstrate a diminished falls risk. EVAL: Champion Balance = 49  At PN: Progressing, Champion Balance = 50  4. Patient will report \"No difficulty\" with getting in/out of the car per FOTO in order to improve community access. EVAL: \"Moderate difficulty\" reported  At PN: Patient reports continued difficulty with getting in/out of the car     Updated Goals: To be completed in 2 weeks  1. Patient will demonstrate L/R SLS >10 seconds in order to demonstrate a decreased falls risk. 7/7/2017: L SLS 6 sec, R SLS 4 sec    Key functional changes:     L LE SLS: 6 sec  R LE SLS: 4 sec                        Champion Balance Scale: 50/56      Assessment / Recommendations:    Since SoC patient has demonstrated a 15 point improvement on FOTO with patient subjectively reporting a significant improvement in unsteadiness with reduced use of his SPC. Patient continues to subjectively report increased imbalance with fatigue with patient noted with continued poor balance with SLS bilaterally and weight-shifting outside of his base of support.  Within clinic patient with improved tolerance to weight-bearing activities thus allowing for appropriate progression each session.     Patient will continue to benefit from skilled PT services to modify and progress therapeutic interventions, address functional mobility deficits, address strength deficits, analyze and address soft tissue restrictions, analyze and cue movement patterns, analyze and modify body mechanics/ergonomics and assess and modify postural abnormalities to attain remaining goals.     Problem List: pain affecting function, decrease ROM, decrease strength, edema affecting function, impaired gait/ balance, decrease ADL/ functional abilitiies, decrease activity tolerance and decrease flexibility/ joint mobility   Treatment Plan: Therapeutic exercise, Therapeutic activities, Neuromuscular re-education, Physical agent/modality, Gait/balance training and Manual therapy    Updated Goals: To be completed in 2 weeks  1. Patient will demonstrate L/R SLS >10 seconds in order to demonstrate a decreased falls risk. 7/7/2017: L SLS 6 sec, R SLS 4 sec    Frequency / Duration: Patient to be seen 2 times per week for 2 weeks:    G-Codes (GP)  Mobility  [id number] Current  CK= 40-59%   Goal  CK= 40-59%    The severity rating is based on clinical judgment and the FOTO score.       Cecilia Rodrigues, PT 7/7/2017 9:40 AM

## 2017-07-07 NOTE — PROGRESS NOTES
PT DAILY TREATMENT NOTE - [facility]     Patient Name: Jarad Heck  Date:2017  : 1945  [x]  Patient  Verified  Payor: Alejo Caryn / Plan: [place] MEDICARE PART A & B / Product Type: Medicare /    In BIXL:4747  Out time:1000  Total Treatment Time (min): 62  Total Timed Codes (min): 52  1:1 Treatment Time ( [address] only): 30   Visit #: 8 of 12    Treatment Area: Other abnormalities of gait and mobility [R26.89]    SUBJECTIVE  Pain Level (0-10 scale): 5/10  Any medication changes, allergies to medications, adverse drug reactions, diagnosis change, or new procedure performed?: [x] No    [] Yes (see summary sheet for update)  Subjective functional status/changes:   [] No changes reported  Patient reports overall since SoC an improvement in symptoms with good compliance with prescribed HEP. Patient reports an improvement in unsteadiness with gait with decreased usage of cane subjectively reported.     OBJECTIVE    Modality rationale: decrease pain and increase tissue extensibility to improve the patients ability to don/doff shoes with greater ease   Min Type Additional Details   10 []  Ice     [x]  heat  []  Ice massage Position: Seated  Location: Lumbar Spine, Post-Tx     41 min Therapeutic Exercise:  [x] See flow sheet : Emphasis on gluteal and quadriceps strengthening   Rationale: increase ROM and increase strength to improve the patients ability to perform errands without restriction    10 min Neuromuscular Re-education:  [x]  See flow sheet : Emphasis placed on LE proprioceptive sense on even/unevne surfaces   Rationale: improve coordination, improve balance and increase proprioception  to improve the patients ability to ambulate with a decreased falls risk          With   [] TE   [] TA   [] neuro   [] other: Patient Education: [x] Review HEP    [] Progressed/Changed HEP based on:   [] positioning   [] body mechanics   [] transfers   [] heat/ice application    [] other:      Other Objective/Functional Measures:     L LE SLS: 6 sec  R LE SLS: 4 sec     Champion Balance Scale: 50/56    Pain Level (0-10 scale) post treatment: 2/10    ASSESSMENT/Changes in Function: Since SoC patient has demonstrated a 15 point improvement on FOTO with patient subjectively reporting a significant improvement in unsteadiness with reduced use of his SPC. Patient continues to subjectively report increased imbalance with fatigue with patient noted with continued poor balance with SLS bilaterally and weight-shifting outside of his base of support. Within clinic patient with improved tolerance to weight-bearing activities thus allowing for appropriate progression each session. Patient will continue to benefit from skilled PT services to modify and progress therapeutic interventions, address functional mobility deficits, address strength deficits, analyze and address soft tissue restrictions, analyze and cue movement patterns, analyze and modify body mechanics/ergonomics and assess and modify postural abnormalities to attain remaining goals. []  See Plan of Care  []  See progress note/recertification  []  See Discharge Summary         Progress towards goals / Updated goals:    Short Term Goals: To be accomplished in 3 weeks:  1. Patient will be independent in performance of prescribed HEP. EVAL: HEP provided  Current: MET, Reported performance 2x/day 6/16/17  2. Patient will be able to ambulate 2 blocks with only \"Moderate Difficulty\" per FOTO in order to improve community access. EVAL: \"Quite a bit of bit of difficulty\" reported  Current: Not met, \"quite difficult\" due to SOB which causes light headedness and LOB. 6/28/17   3. Patient will demonstrate the ability to perform a posterior pelvic tilt in standing with correct form in order to improve proximal stability and standing tolerance. EVAL: Patient educated in PPT in supine  Current: Progressing: Pt performs PPT in standing with minimal ROM.   6/23/17  Long Term Goals: To be accomplished in 6 weeks:  1. Patient will demonstrate a significant functional improvement as demonstrated by a score of >50 on FOTO. EVAL: FOTO = 32  Current: Progressing, 47 an increase of 15.  7/5/17  2. Patient will demonstrate the ability to perform sit-to-stand with correct mechanics and form in order to improve ability to perform transfers safely. EVAL: Unweighting of R LE with performance  Current: Met, Symmetrical weight-bearing through bilateral LE with correct mechanics, 7/7/2017  3. Patient will score >52 on the Champion Balance Test in order to demonstrate a diminished falls risk. EVAL: Champion Balance = 49  Current: Progressing, Champion Balance = 50, 7/7/2017  4. Patient will report \"No difficulty\" with getting in/out of the car per FOTO in order to improve community access. EVAL: \"Moderate difficulty\" reported  Current: Patient reports continued difficulty with getting in/out of the car, 7/7/2017    Updated Goals: To be completed in 2 weeks  1. Patient will demonstrate L/R SLS >10 seconds in order to demonstrate a decreased falls risk.   7/7/2017: L SLS 6 sec, R SLS 4 sec    PLAN  [x]  Upgrade activities as tolerated     [x]  Continue plan of care  []  Update interventions per flow sheet       []  Discharge due to:_  []  Other:_      Chio Best, PT 7/7/2017  8:47 AM    Future Appointments  Date Time Provider Danielito Crawford   7/7/2017 9:00 AM Chio Best, PT [facility]   7/12/2017 9:00 AM Porsche E Laws, PTA [facility]   7/14/2017 9:00 AM Porsche E Laws, PTA [facility]   7/19/2017 9:00 AM Porsche E Laws, PTA [facility]   7/21/2017 9:00 AM Porsche E Laws, PTA [facility]

## 2017-07-12 ENCOUNTER — HOSPITAL ENCOUNTER (OUTPATIENT)
Dept: PHYSICAL THERAPY | Age: 72
Discharge: HOME | End: 2017-07-12
Payer: MEDICARE

## 2017-07-12 PROCEDURE — 97110 THERAPEUTIC EXERCISES: CPT

## 2017-07-12 PROCEDURE — 97112 NEUROMUSCULAR REEDUCATION: CPT

## 2017-07-12 NOTE — PROGRESS NOTES
PT DAILY TREATMENT NOTE - [facility]     Patient Name: Donald Centeno  Date:2017  : 1945  [x]  Patient  Verified  Payor: Leland Mosher / Plan: [place] MEDICARE PART A & B / Product Type: Medicare /    In time: 8:55  Out time:10:00  Total Treatment Time (min): 65  Total Timed Codes (min): 55  1:1 Treatment Time ( [address] only): 23   Visit #: 1 of 4    Treatment Area: Other abnormalities of gait and mobility [R26.89]    SUBJECTIVE  Pain Level (0-10 scale): 6/10  Any medication changes, allergies to medications, adverse drug reactions, diagnosis change, or new procedure performed?: [x] No    [] Yes (see summary sheet for update)  Subjective functional status/changes:   [] No changes reported  Pt states he feels off balance, more to his right side. OBJECTIVE    Modality rationale: decrease pain to improve the patients ability to increase ease of ADLs.     Min Type Additional Details    [] Estim:  []Unatt       []IFC  []Premod                        []Other:  []w/ice   []w/heat  Position:  Location:    [] Estim: []Att    []TENS instruct  []NMES                    []Other:  []w/US   []w/ice   []w/heat  Position:  Location:    []  Traction: [] Cervical       []Lumbar                       [] Prone          []Supine                       []Intermittent   []Continuous Lbs:  [] before manual  [] after manual    []  Ultrasound: []Continuous   [] Pulsed                           []1MHz   []3MHz W/cm2:  Location:    []  Iontophoresis with dexamethasone         Location: [] Take home patch   [] In clinic   10 []  Ice     [x]  heat  []  Ice massage  []  Laser   []  Anodyne Position:  reclined  Location:  Low back    []  Laser with stim  []  Other:  Position:  Location:    []  Vasopneumatic Device Pressure:       [] lo [] med [] hi   Temperature: [] lo [] med [] hi   [] Skin assessment post-treatment:  []intact []redness- no adverse reaction    []redness  adverse reaction:     45 min Therapeutic Exercise:  [x] See flow sheet : Rationale: increase ROM and increase strength to improve the patients ability to perform ADLs. 10 min Neuromuscular Re-education:  [x]  See flow sheet : balance acts per flow sheet   Rationale: increase strength, improve coordination, improve balance and increase proprioception  to improve the patients ability to increase ease of ADLs. With   [] TE   [] TA   [] neuro   [] other: Patient Education: [x] Review HEP    [] Progressed/Changed HEP based on:   [] positioning   [] body mechanics   [] transfers   [] heat/ice application    [] other:      Other Objective/Functional Measures: Pt performs PPT in standing, small ROM. Pain Level (0-10 scale) post treatment: 2/10    ASSESSMENT/Changes in Function: Cont per POC. Patient will continue to benefit from skilled PT services to modify and progress therapeutic interventions, address functional mobility deficits, address strength deficits and address imbalance/dizziness to attain remaining goals. []  See Plan of Care  []  See progress note/recertification  []  See Discharge Summary         Progress towards goals / Updated goals:  Short Term Goals: To be accomplished in 3 weeks:  1. Patient will be independent in performance of prescribed HEP. EVAL: HEP provided  Current: MET, Reported performance 2x/day 6/16/17  2. Patient will be able to ambulate 2 blocks with only \"Moderate Difficulty\" per FOTO in order to improve community access. EVAL: \"Quite a bit of bit of difficulty\" reported  Current: Not met, \"quite difficult\" due to SOB which causes light headedness and LOB. 6/28/17   3. Patient will demonstrate the ability to perform a posterior pelvic tilt in standing with correct form in order to improve proximal stability and standing tolerance. EVAL: Patient educated in PPT in supine  Current: Progressing: Pt performs PPT in standing with minimal ROM.  7/12/17  Long Term Goals: To be accomplished in 6 weeks:  1.  Patient will demonstrate a significant functional improvement as demonstrated by a score of >50 on FOTO. EVAL: FOTO = 32  Current: Progressing, 47 an increase of 15.  7/5/17  2. Patient will demonstrate the ability to perform sit-to-stand with correct mechanics and form in order to improve ability to perform transfers safely. EVAL: Unweighting of R LE with performance  Current: Met, Symmetrical weight-bearing through bilateral LE with correct mechanics, 7/7/2017  3. Patient will score >52 on the Champion Balance Test in order to demonstrate a diminished falls risk. EVAL: Champion Balance = 49  Current: Progressing, Champion Balance = 50, 7/7/2017  4. Patient will report \"No difficulty\" with getting in/out of the car per FOTO in order to improve community access. EVAL: \"Moderate difficulty\" reported  Current: Patient reports continued difficulty with getting in/out of the car, 7/7/2017     Updated Goals: To be completed in 2 weeks  1. Patient will demonstrate L/R SLS >10 seconds in order to demonstrate a decreased falls risk.   7/7/2017: L SLS 6 sec, R SLS 4 sec    PLAN  []  Upgrade activities as tolerated     [x]  Continue plan of care  []  Update interventions per flow sheet       []  Discharge due to:_  []  Other:_      Porsche Alvarado PTA 7/12/2017  9:35 AM    Future Appointments  Date Time Provider Danielito Crawford   7/14/2017 9:00 AM Porsche Alvarado PTA [facility]   7/19/2017 9:00 AM NUSRAT Harris [facility]   7/21/2017 9:00 AM NUSRAT Harris [facility]

## 2017-07-14 ENCOUNTER — HOSPITAL ENCOUNTER (OUTPATIENT)
Dept: PHYSICAL THERAPY | Age: 72
Discharge: HOME | End: 2017-07-14
Payer: MEDICARE

## 2017-07-14 PROCEDURE — 97112 NEUROMUSCULAR REEDUCATION: CPT

## 2017-07-14 PROCEDURE — 97140 MANUAL THERAPY 1/> REGIONS: CPT

## 2017-07-14 PROCEDURE — 97110 THERAPEUTIC EXERCISES: CPT

## 2017-07-14 NOTE — PROGRESS NOTES
PT DAILY TREATMENT NOTE - [facility]     Patient Name: Nakul Arizmendi  Date:2017  : 1945  [x]  Patient  Verified  Payor: Makayla Meehan / Plan: [place] MED[place] & B / Product Type: Medicare /    In time: 9:00  Out time: 10:02  Total Treatment Time (min): 62  Total Timed Codes (min): 52  1:1 Treatment Time ( [address] only): 30   Visit #: 2 of 4    Treatment Area: Other abnormalities of gait and mobility [R26.89]    SUBJECTIVE  Pain Level (0-10 scale): 10  Any medication changes, allergies to medications, adverse drug reactions, diagnosis change, or new procedure performed?: [x] No    [] Yes (see summary sheet for update)  Subjective functional status/changes:   [] No changes reported  Pt states the MD say he has some bone on bone in his spine. He states he needs a fusion but he is on blood thinners due to blood clots so he is not a candidate for surgery right now. OBJECTIVE    Modality rationale: decrease pain to improve the patients ability to increase ease of ADLs.     Min Type Additional Details    [] Estim:  []Unatt       []IFC  []Premod                        []Other:  []w/ice   []w/heat  Position:  Location:    [] Estim: []Att    []TENS instruct  []NMES                    []Other:  []w/US   []w/ice   []w/heat  Position:  Location:    []  Traction: [] Cervical       []Lumbar                       [] Prone          []Supine                       []Intermittent   []Continuous Lbs:  [] before manual  [] after manual    []  Ultrasound: []Continuous   [] Pulsed                           []1MHz   []3MHz W/cm2:  Location:    []  Iontophoresis with dexamethasone         Location: [] Take home patch   [] In clinic   10 []  Ice     [x]  heat  []  Ice massage  []  Laser   []  Anodyne Position: reclined  Location: Low back    []  Laser with stim  []  Other:  Position:  Location:    []  Vasopneumatic Device Pressure:       [] lo [] med [] hi   Temperature: [] lo [] med [] hi   [] Skin assessment post-treatment: []intact []redness- no adverse reaction    []redness  adverse reaction:     42 min Therapeutic Exercise:  [x] See flow sheet :   Rationale: increase ROM and increase strength to improve the patients ability to perform ADLs. 10 min Neuromuscular Re-education:  [x]  See flow sheet : balance acts per flow sheet   Rationale: decrease pain, increase ROM and increase tissue extensibility to increase ease of ADLs. With   [] TE   [] TA   [] neuro   [] other: Patient Education: [x] Review HEP    [] Progressed/Changed HEP based on:   [] positioning   [] body mechanics   [] transfers   [] heat/ice application    [] other:      Other Objective/Functional Measures: Pt holds SLS on R for 7\" and L for 15\". Pain Level (0-10 scale) post treatment: 2/10    ASSESSMENT/Changes in Function: Cont per POC. Patient will continue to benefit from skilled PT services to modify and progress therapeutic interventions, address functional mobility deficits, address ROM deficits and address strength deficits to attain remaining goals. []  See Plan of Care  []  See progress note/recertification  []  See Discharge Summary         Progress towards goals / Updated goals:  Short Term Goals: To be accomplished in 3 weeks:  1. Patient will be independent in performance of prescribed HEP. EVAL: HEP provided  Current: MET, Reported performance 2x/day 6/16/17  2. Patient will be able to ambulate 2 blocks with only \"Moderate Difficulty\" per FOTO in order to improve community access. EVAL: \"Quite a bit of bit of difficulty\" reported  Current: Not met, \"quite difficult\" due to SOB which causes light headedness and LOB. 6/28/17   3. Patient will demonstrate the ability to perform a posterior pelvic tilt in standing with correct form in order to improve proximal stability and standing tolerance. EVAL: Patient educated in PPT in supine  Current: Progressing: Pt performs PPT in standing with minimal ROM.   7/12/17    Long Term Goals: To be accomplished in 6 weeks:  1. Patient will demonstrate a significant functional improvement as demonstrated by a score of >50 on FOTO. EVAL: FOTO = 32  Current: Progressing, 47 an increase of 15.  7/5/17  2. Patient will demonstrate the ability to perform sit-to-stand with correct mechanics and form in order to improve ability to perform transfers safely. EVAL: Unweighting of R LE with performance  Current: Met, Symmetrical weight-bearing through bilateral LE with correct mechanics, 7/7/2017  3. Patient will score >52 on the Champion Balance Test in order to demonstrate a diminished falls risk. EVAL: Champion Balance = 49  Current: Progressing, Champion Balance = 50, 7/7/2017  4. Patient will report \"No difficulty\" with getting in/out of the car per FOTO in order to improve community access. EVAL: \"Moderate difficulty\" reported  Current: Patient reports continued difficulty with getting in/out of the car, 7/7/2017      Updated Goals: To be completed in 2 weeks  1. Patient will demonstrate L/R SLS >10 seconds in order to demonstrate a decreased falls risk. 7/7/2017: L SLS 6 sec, R SLS 4 sec  Current: Partially met, R 7\", L15\".   7/14/17    PLAN  []  Upgrade activities as tolerated     [x]  Continue plan of care  []  Update interventions per flow sheet       []  Discharge due to:_  []  Other:_      Porsche Alvarado PTA 7/14/2017  9:24 AM    Future Appointments  Date Time Provider Danielito Crawford   7/19/2017 9:00 AM [facility]   7/21/2017 9:00 AM NUSRAT Harris [facility]

## 2017-07-19 ENCOUNTER — HOSPITAL ENCOUNTER (OUTPATIENT)
Dept: PHYSICAL THERAPY | Age: 72
Discharge: HOME | End: 2017-07-19
Payer: MEDICARE

## 2017-07-19 PROCEDURE — 97112 NEUROMUSCULAR REEDUCATION: CPT

## 2017-07-19 PROCEDURE — 97110 THERAPEUTIC EXERCISES: CPT

## 2017-07-19 NOTE — PROGRESS NOTES
PT DAILY TREATMENT NOTE - [facility]     Patient Name: Melly Marin  Date:2017  : 1945  [x]  Patient  Verified  Payor: Delfni Brian / Plan: [place] MEDICARE PART A & B / Product Type: Medicare /    In time: 8: 46  Out time: 9:54  Total Treatment Time (min): 62  Total Timed Codes (min): 52  1:1 Treatment Time ( [address] only): 30   Visit #: 3 of 4    Treatment Area: Other abnormalities of gait and mobility [R26.89]    SUBJECTIVE  Pain Level (0-10 scale): 3/10  Any medication changes, allergies to medications, adverse drug reactions, diagnosis change, or new procedure performed?: [x] No    [] Yes (see summary sheet for update)  Subjective functional status/changes:   [] No changes reported  Pt reports he does not have difficulty getting into his trunk, but he does have difficulty getting into and out of low cars. OBJECTIVE    Modality rationale: decrease pain to improve the patients ability to increase eaes of ADLs.     Min Type Additional Details    [] Estim:  []Unatt       []IFC  []Premod                        []Other:  []w/ice   []w/heat  Position:  Location:    [] Estim: []Att    []TENS instruct  []NMES                    []Other:  []w/US   []w/ice   []w/heat  Position:  Location:    []  Traction: [] Cervical       []Lumbar                       [] Prone          []Supine                       []Intermittent   []Continuous Lbs:  [] before manual  [] after manual    []  Ultrasound: []Continuous   [] Pulsed                           []1MHz   []3MHz W/cm2:  Location:    []  Iontophoresis with dexamethasone         Location: [] Take home patch   [] In clinic   10 []  Ice     [x]  heat  []  Ice massage  []  Laser   []  Anodyne Position:  seated  Location: low back    []  Laser with stim  []  Other:  Position:  Location:    []  Vasopneumatic Device Pressure:       [] lo [] med [] hi   Temperature: [] lo [] med [] hi   [] Skin assessment post-treatment:  []intact []redness- no adverse reaction    []redness  adverse reaction:     42 min Therapeutic Exercise:  [x] See flow sheet :   Rationale: increase ROM and increase strength to improve the patients ability to perform ADLs. 10 min Neuromuscular Re-education: balance acts per flow sheet   Rationale: decrease pain, increase ROM and increase tissue extensibility to perform ADLs. With   [] TE   [] TA   [] neuro   [] other: Patient Education: [x] Review HEP    [] Progressed/Changed HEP based on:   [] positioning   [] body mechanics   [] transfers   [] heat/ice application    [] other:      Other Objective/Functional Measures: Pt reports slight to moderate difficulty getting into and out of his wife's smaller SUV. Pain Level (0-10 scale) post treatment: 1/10    ASSESSMENT/Changes in Function: Pt demos increased stability with Rhom with head turns. Patient will continue to benefit from skilled PT services to modify and progress therapeutic interventions, address functional mobility deficits, address ROM deficits and address strength deficits to attain remaining goals. []  See Plan of Care  []  See progress note/recertification  []  See Discharge Summary         Progress towards goals / Updated goals:  Short Term Goals: To be accomplished in 3 weeks:  1. Patient will be independent in performance of prescribed HEP. EVAL: HEP provided  Current: MET, Reported performance 2x/day 6/16/17  2. Patient will be able to ambulate 2 blocks with only \"Moderate Difficulty\" per FOTO in order to improve community access. EVAL: \"Quite a bit of bit of difficulty\" reported  Current: Not met, \"quite difficult\" due to SOB which causes light headedness and LOB. 6/28/17   3. Patient will demonstrate the ability to perform a posterior pelvic tilt in standing with correct form in order to improve proximal stability and standing tolerance. EVAL: Patient educated in PPT in supine  Current: Progressing: Pt performs PPT in standing with minimal ROM.   7/12/17     Long Term Goals: To be accomplished in 6 weeks:  1. Patient will demonstrate a significant functional improvement as demonstrated by a score of >50 on FOTO. EVAL: FOTO = 32  Current: Progressing, 47 an increase of 15.  7/5/17  2. Patient will demonstrate the ability to perform sit-to-stand with correct mechanics and form in order to improve ability to perform transfers safely. EVAL: Unweighting of R LE with performance  Current: Met, Symmetrical weight-bearing through bilateral LE with correct mechanics, 7/7/2017  3. Patient will score >52 on the Champion Balance Test in order to demonstrate a diminished falls risk. EVAL: Champion Balance = 49  Current: Progressing, Champion Balance = 50, 7/7/2017  4. Patient will report \"No difficulty\" with getting in/out of the car per FOTO in order to improve community access. EVAL: \"Moderate difficulty\" reported  Current: Patient reports slight to moderate difficulty with getting in/out of his wife's smaller vehicle. 7/19/2017      Updated Goals: To be completed in 2 weeks  1. Patient will demonstrate L/R SLS >10 seconds in order to demonstrate a decreased falls risk. 7/7/2017: L SLS 6 sec, R SLS 4 sec  Current: Partially met, R 7\", L15\".   7/14/17    PLAN  []  Upgrade activities as tolerated     [x]  Continue plan of care  []  Update interventions per flow sheet       []  Discharge due to:_  []  Other:_      Porsche Alvarado PTA 7/19/2017  9:44 AM    Future Appointments  Date Time Provider Danielito Crawford   7/21/2017 9:00 AM Porsche Alvarado PTA [facility]

## 2017-07-21 ENCOUNTER — HOSPITAL ENCOUNTER (OUTPATIENT)
Dept: PHYSICAL THERAPY | Age: 72
Discharge: HOME | End: 2017-07-21
Payer: MEDICARE

## 2017-07-21 PROCEDURE — G8979 MOBILITY GOAL STATUS: HCPCS

## 2017-07-21 PROCEDURE — 97112 NEUROMUSCULAR REEDUCATION: CPT

## 2017-07-21 PROCEDURE — 97110 THERAPEUTIC EXERCISES: CPT

## 2017-07-21 PROCEDURE — G8980 MOBILITY D/C STATUS: HCPCS

## 2017-07-21 NOTE — PROGRESS NOTES
[facility]              [address]        [place], [address] [place]   [phone] fax      Discharge Summary  Patient name: Warren Nice Start of Care: 2017   Referral source: Gaby Corbett MD : 1945   Medical/Treatment Diagnosis: Other abnormalities of gait and mobility [R26.89] Onset Date:     Prior Hospitalization: see medical history Provider#: [id number]   Medications: Verified on Patient Summary List    Comorbidities: Anxiety, Arthritis, Back pain, COPD, Depression, HTN, Kidney Disease, Osteoporosis, Sleep Dysfunction, PE (),    Prior Level of Function: Prior to  patient denies use of AD with ambulation with (I) with performance of self-care and household ADLs  Visits from Start of Care: 12    Missed Visits: 0  Reporting Period : 2017 to 2017    Summary of Care:    [facility] be accomplished in 6 weeks:  1. Patient will demonstrate a significant functional improvement as demonstrated by a score of >50 on FOTO. EVAL: FOTO = 32  At DC: Progressing, 47 an increase of 15  2. Patient will demonstrate the ability to perform sit-to-stand with correct mechanics and form in order to improve ability to perform transfers safely. EVAL: Unweighting of R LE with performance  At DC: Met, Symmetrical weight-bearing through bilateral LE with correct mechanics  3. Patient will score >52 on the Champion Balance Test in order to demonstrate a diminished falls risk. EVAL: Champion Balance = 49  At DC: Progressing, Champion Balance = 50  4. Patient will report \"No difficulty\" with getting in/out of the car per FOTO in order to improve community access. EVAL: \"Moderate difficulty\" reported  At DC: Goal met: No difficulty per FOTO  5. Patient will demonstrate L/R SLS >10 seconds in order to demonstrate a decreased falls risk. 2017: L SLS 6 sec, R SLS 4 sec  At DC: Goal met, R 15\", L13\".       G-Codes (GP)  Mobility   [id number] Goal  CK= 40-59%  D/C  CK= 40-59%    The severity rating is based on clinical judgment and the FOTO Score score. ASSESSMENT/RECOMMENDATIONS:    Pt has progressed well toward LTGs. Pt reports 75% improvement since [facility]. Pt has a FOTO score of 47, increased by 15 since [facility]. Pt is able to peform SLS >10 sec B. Pt has no difficulty getting in and out of the car. Pt has moderate difficulty walking 2 blocks. Pt is able to perform PPT in standing. Pt is able to sit to stand with weight distributed evenly. Pt has Champion balance score of 50.      [x]Discontinue therapy: [x]Patient has reached or is progressing toward set goals      []Patient is non-compliant or has abdicated      []Due to lack of appreciable progress towards set [address], REBECCA 7/21/2017 4:18 PM

## 2017-07-21 NOTE — PROGRESS NOTES
PT DAILY TREATMENT NOTE - [facility]     Patient Name: Florinda De  Date:2017  : 1945  [x]  Patient  Verified  Payor: Berelukasz Grace / Plan: [place] MEDICARE PART A & B / Product Type: Medicare /    In time:8:53  Out time:9:47  Total Treatment Time (min): 54  Total Timed Codes (min): 44  1:1 Treatment Time ( [address] only): 30   Visit #: 4 of 4    Treatment Area: Other abnormalities of gait and mobility [R26.89]    SUBJECTIVE  Pain Level (0-10 scale): 3  Any medication changes, allergies to medications, adverse drug reactions, diagnosis change, or new procedure performed?: [x] No    [] Yes (see summary sheet for update)  Subjective functional status/changes:   [] No changes reported  Pt reports he feels his balance has gotten better because he has not been using his cane as much. OBJECTIVE    Modality rationale: decrease pain to improve the patients ability to decrease difficulty while performing tasks.     Min Type Additional Details    [] Estim:  []Unatt       []IFC  []Premod                        []Other:  []w/ice   []w/heat  Position:  Location:    [] Estim: []Att    []TENS instruct  []NMES                    []Other:  []w/US   []w/ice   []w/heat  Position:  Location:    []  Traction: [] Cervical       []Lumbar                       [] Prone          []Supine                       []Intermittent   []Continuous Lbs:  [] before manual  [] after manual    []  Ultrasound: []Continuous   [] Pulsed                           []1MHz   []3MHz W/cm2:  Location:    []  Iontophoresis with dexamethasone         Location: [] Take home patch   [] In clinic   10 []  Ice     [x]  heat  []  Ice massage  []  Laser   []  Anodyne Position:itting  Location:back     []  Laser with stim  []  Other:  Position:  Location:    []  Vasopneumatic Device Pressure:       [] lo [] med [] hi   Temperature: [] lo [] med [] hi   [] Skin assessment post-treatment:  []intact []redness- no adverse reaction    []redness  adverse reaction: 29 min Therapeutic Exercise:  [x] See flow sheet :   Rationale: increase ROM and increase strength to improve the patients ability to increase ease with ADLs. 15 min Neuromuscular Re-education:  [x]  See flow sheet :balance exercises and dynamic gait    Rationale: increase ROM, increase strength, improve coordination and improve balance  to improve the patients ability to increase tolerance to activities. With   [] TE   [] TA   [] neuro   [] other: Patient Education: [x] Review HEP    [] Progressed/Changed HEP based on:   [] positioning   [] body mechanics   [] transfers   [] heat/ice application    [] other:      Other Objective/Functional Measures: see goals     Pain Level (0-10 scale) post treatment: 0    ASSESSMENT/Changes in Function: Pt has progressed well toward LTGs. Pt reports 75% improvement since [facility]. Pt has a FOTO score of 47, increased by 15 since [facility]. Pt is able to SLS >10 sec B. Pt has no difficulty getting in and out of the car. Pt has moderate difficulty walking 2 blocks. Pt is able to perform PPT in standing. Pt is able to sit to stand with weight distributed evenly. Pt has Champion balance score of 50.          []  See Plan of Care  []  See progress note/recertification  [x]  See Discharge Summary         Progress towards goals / Updated goals:  Short Term Goals: To be accomplished in 3 weeks:  1. Patient will be independent in performance of prescribed HEP. EVAL: HEP provided  Current: MET, Reported performance 2x/day 6/16/17  2. Patient will be able to ambulate 2 blocks with only \"Moderate Difficulty\" per FOTO in order to improve community access. EVAL: \"Quite a bit of bit of difficulty\" reported  Current: Goal met\" per FOTO 7/21/17   3. Patient will demonstrate the ability to perform a posterior pelvic tilt in standing with correct form in order to improve proximal stability and standing tolerance.   EVAL: Patient educated in PPT in supine  Current: Goal met: Progressing: Pt performs PPT in standing  7/21/17      Long Term Goals: To be accomplished in 6 weeks:  1. Patient will demonstrate a significant functional improvement as demonstrated by a score of >50 on FOTO. EVAL: FOTO = 32  Current: Progressing, 47 an increase of 15.  7/21/17  2. Patient will demonstrate the ability to perform sit-to-stand with correct mechanics and form in order to improve ability to perform transfers safely. EVAL: Unweighting of R LE with performance  Current: Met, Symmetrical weight-bearing through bilateral LE with correct mechanics, 7/7/2017  3. Patient will score >52 on the Champion Balance Test in order to demonstrate a diminished falls risk. EVAL: Champion Balance = 49  Current: Progressing, Champion Balance = 50, 7/7/2017  4. Patient will report \"No difficulty\" with getting in/out of the car per FOTO in order to improve community access. EVAL: \"Moderate difficulty\" reported  Current: Goal met: No difficulty per [facility] 7/21/17      Updated Goals: To be completed in 2 weeks  1. Patient will demonstrate L/R SLS >10 seconds in order to demonstrate a decreased falls risk.   7/7/2017: L SLS 6 sec, R SLS 4 sec  Current: Goal met, R 15\", L13\".  7/21/17    PLAN  []  Upgrade activities as tolerated     [x]  Continue plan of care  []  Update interventions per flow sheet       []  Discharge due to:_  []  Other:_      Roni See PTA 7/21/2017  8:50 AM    Future Appointments  Date Time Provider Danielito Crawford   7/21/2017 9:00 AM Roni See PTA [facility]

## 2025-05-11 ENCOUNTER — TRANSCRIBE ORDERS (OUTPATIENT)
Facility: HOSPITAL | Age: 80
End: 2025-05-11

## 2025-05-11 DIAGNOSIS — Z01.89 ENCOUNTER FOR OTHER SPECIFIED SPECIAL EXAMINATIONS: Primary | ICD-10-CM

## 2025-06-03 ENCOUNTER — HOSPITAL ENCOUNTER (OUTPATIENT)
Facility: HOSPITAL | Age: 80
Discharge: HOME | End: 2025-06-06

## 2025-06-03 DIAGNOSIS — Z01.89 ENCOUNTER FOR OTHER SPECIFIED SPECIAL EXAMINATIONS: ICD-10-CM

## 2025-06-03 RX ORDER — FLUDEOXYGLUCOSE F-18 500 MCI/ML
11 INJECTION INTRAVENOUS
Status: DISCONTINUED | OUTPATIENT
Start: 2025-06-03 | End: 2025-06-07 | Stop reason: HOSPADM

## 2025-06-03 NOTE — CARE COORDINATION
Pt arrived at 12:30pm for scheduled oncology Skull to thigh PETCT.  Pt's verbal history & chart review did not support oncology imaging.  Further evaluation of the chart reflected a history of cardiac sarcoid findings in 8/2023 and [facility] limited cardiac sarcoid exam for cardiac sarcoid on 6/3/2025.  Patient prep for the exam was for oncological indications, not sarcoid prep (typically low carb/high fat x48hrs prior to exam).  Test results would likely be sub-optimal. Multiple attempts to contact ordering provider failed. Spoke with [facility]/Internal medicine  who initially could not verify Dr. Michel was a physician at [facility].  [facility] to the [facility] with no response.     Unable to verify with provider accuracy of order prior to radiotracer injection expiration time. Advised patient to reach out to provider to reschedule exam if not contacted within next few days. Stressed desire to ensure correct imaging was completed. Kit